# Patient Record
Sex: FEMALE | Race: BLACK OR AFRICAN AMERICAN | Employment: FULL TIME | URBAN - METROPOLITAN AREA
[De-identification: names, ages, dates, MRNs, and addresses within clinical notes are randomized per-mention and may not be internally consistent; named-entity substitution may affect disease eponyms.]

---

## 2022-11-16 ENCOUNTER — HOSPITAL ENCOUNTER (EMERGENCY)
Age: 37
Discharge: HOME | End: 2022-11-16
Attending: EMERGENCY MEDICINE

## 2022-11-16 VITALS
RESPIRATION RATE: 17 BRPM | DIASTOLIC BLOOD PRESSURE: 102 MMHG | HEIGHT: 65 IN | TEMPERATURE: 98.7 F | WEIGHT: 293 LBS | SYSTOLIC BLOOD PRESSURE: 160 MMHG | OXYGEN SATURATION: 98 % | BODY MASS INDEX: 48.82 KG/M2 | HEART RATE: 66 BPM

## 2022-11-16 DIAGNOSIS — M25.512 ACUTE PAIN OF LEFT SHOULDER: Primary | ICD-10-CM

## 2022-11-16 LAB
EKG ATRIAL RATE: 66 BPM
EKG DIAGNOSIS: NORMAL
EKG P AXIS: 42 DEGREES
EKG P-R INTERVAL: 146 MS
EKG Q-T INTERVAL: 410 MS
EKG QRS DURATION: 84 MS
EKG QTC CALCULATION (BAZETT): 429 MS
EKG R AXIS: 30 DEGREES
EKG T AXIS: -2 DEGREES
EKG VENTRICULAR RATE: 66 BPM

## 2022-11-16 PROCEDURE — 93005 ELECTROCARDIOGRAM TRACING: CPT | Performed by: PHYSICIAN ASSISTANT

## 2022-11-16 PROCEDURE — 99284 EMERGENCY DEPT VISIT MOD MDM: CPT

## 2022-11-16 PROCEDURE — 6360000002 HC RX W HCPCS: Performed by: PHYSICIAN ASSISTANT

## 2022-11-16 PROCEDURE — 96372 THER/PROPH/DIAG INJ SC/IM: CPT

## 2022-11-16 PROCEDURE — 6370000000 HC RX 637 (ALT 250 FOR IP): Performed by: PHYSICIAN ASSISTANT

## 2022-11-16 RX ORDER — KETOROLAC TROMETHAMINE 30 MG/ML
15 INJECTION, SOLUTION INTRAMUSCULAR; INTRAVENOUS ONCE
Status: COMPLETED | OUTPATIENT
Start: 2022-11-16 | End: 2022-11-16

## 2022-11-16 RX ORDER — METHOCARBAMOL 500 MG/1
1000 TABLET, FILM COATED ORAL ONCE
Status: COMPLETED | OUTPATIENT
Start: 2022-11-16 | End: 2022-11-16

## 2022-11-16 RX ORDER — LIDOCAINE 4 G/G
1 PATCH TOPICAL DAILY
Status: DISCONTINUED | OUTPATIENT
Start: 2022-11-16 | End: 2022-11-16 | Stop reason: HOSPADM

## 2022-11-16 RX ADMIN — KETOROLAC TROMETHAMINE 15 MG: 30 INJECTION, SOLUTION INTRAMUSCULAR at 10:07

## 2022-11-16 RX ADMIN — METHOCARBAMOL 1000 MG: 500 TABLET ORAL at 10:05

## 2022-11-16 ASSESSMENT — ENCOUNTER SYMPTOMS
NAUSEA: 0
BACK PAIN: 0
EYE REDNESS: 0
SHORTNESS OF BREATH: 0
VOMITING: 0

## 2022-11-16 ASSESSMENT — LIFESTYLE VARIABLES: HOW OFTEN DO YOU HAVE A DRINK CONTAINING ALCOHOL: NEVER

## 2022-11-16 NOTE — DISCHARGE INSTRUCTIONS
ED Course     Today, you were seen in the Emergency Department. You have been diagnosed with:   1. Acute pain of left shoulder      Disposition/Plan     IMPORTANT INSTRUCTIONS:  Your physical exam and EKG were reassuring. You may alternate Tylenol and Ibuprofen for pain coverage. Take Ibuprofen 600 mg every 6 hours for your pain. Ibuprofen: Take one 600 mg tablet every 6 hours as needed for pain and inflammation. Take this medicine with food, and be sure to drink plenty of water. Do not take this medicine continuously for longer than two weeks. Ibuprofen is a part of a class of medications called nonsteroidal anti-inflammatory drugs (NSAIDs). NSAIDs cause an increased risk of serious gastrointestinal (GI) adverse events, including bleeding, ulceration, and perforation of the stomach or intestines, which can be fatal. These events can occur at any time during use and without warning symptoms. Elderly patients and patients with a prior history of peptic ulcer disease and/or GI bleeding are at greater risk for serious GI events. Nonsteroidal anti-inflammatory drugs (NSAIDs) cause an increased risk of serious cardiovascular events, including heart attack and stroke, which can be fatal. This risk may occur early in treatment and may increase with duration of use. Use with caution. 3 hours after taking the Ibuprofen, alternate with Tylenol 500 mg. Tylenol 500mg   Take 1 tablet by mouth every 8 hours as needed for pain. Tylenol is highly effective when combined with an anti-inflammatory (NSAID) drug such as: ibuprofen (Advil, Aleve, Motrin, Naproxen). If you were prescribed both medications, take them together. They work better when taken together vs one or the other. Tylenol is in other pain medications, such as Vicodin and Percocet. Do not take more than 3,000mg (6 tablets) of Tylenol in one day. Do not consume more than 3 alcoholic beverages while taking Tylenol.    Do not take Tylenol if you have liver disease. You can use the muscle relaxers that were previously prescribed to you. Follow-up with your primary care physician as soon as possible regarding your visit. Be sure to return to the Emergency Department immediately if symptoms worsen or new symptoms occur as we discussed, such as chest pain, difficulty breathing, passing out. PLEASE FOLLOW UP WITH:  DEIDRA Alanis NP  [facility] [place] 8  [phone]    Schedule an appointment as soon as possible for a visit       Additional important information has been included with this packet, please make sure that you have read this information as it will better help you understand you illness/injury better.

## 2022-11-16 NOTE — ED PROVIDER NOTES
[address] ENCOUNTER          PHYSICIAN ASSISTANT NOTE     Date of evaluation: 11/16/2022    Chief Complaint     Shoulder Pain (Left shoulder. Previous injury 18 years ago, thinks she slept on it wrong. Took ibuprofen yesterday, which helped with the pain. She states she called the doctor on demand, who sent in a prescription for ibuprofen and muscle relaxer, but she forgot to pick it up. )    History of Present Illness     HPI: Miquel Gutiérrez is a 29 y.o. female with no pertinent past medical history who presents to the emergency department with left shoulder pain. Patient felt that she slept on her left shoulder wrong over the weekend. Throughout the weekend she had some pain to the left side of her neck as well and had pain with movement of her neck. She denies any direct trauma to her shoulder. Patient then has had some intermittent radiation from her shoulder into her chest, she denies any focal isolated chest pain. Today while she was at work she had worsening pain into her chest at which time she talk to her supervisor and called 911. Of note patient has been taking ibuprofen with significant improvement of pain. Patient also talked to a doctor on demand on Monday night, had muscle relaxers called into the pharmacy though forgot to pick them up. She denies any associated shortness of breath, abdominal pain, nausea or vomiting. She denies any leg swelling    With the exception of the above, there are no aggravating or alleviating factors. Review of Systems     Review of Systems   Constitutional:  Negative for chills and fever. HENT:  Negative for congestion. Eyes:  Negative for redness. Respiratory:  Negative for shortness of breath. Cardiovascular:  Negative for chest pain and leg swelling. Gastrointestinal:  Negative for nausea and vomiting. Genitourinary:  Negative for dysuria, frequency and hematuria. Musculoskeletal:  Positive for neck pain. Negative for back pain. + Shoulder pain   Skin:  Negative for rash. Neurological:  Negative for dizziness and headaches. As stated above, all other systems reviewed and are otherwise negative. Past Medical, Surgical, Family, and Social History     She has no past medical history on file. She has no past surgical history on file. Her family history is not on file. She reports that she has never smoked. She has never used smokeless tobacco. She reports that she does not drink alcohol and does not use drugs. Medications     There are no discharge medications for this patient. Allergies     She has No Known Allergies. Physical Exam     INITIAL VITALS: BP: (!) 150/101, Temp: 98.7 °F (37.1 °C), Heart Rate: 66, Resp: 17, SpO2: 99 %  Physical Exam  Constitutional:       General: She is not in acute distress. Appearance: Normal appearance. She is not ill-appearing, toxic-appearing or diaphoretic. HENT:      Head: Normocephalic and atraumatic. Right Ear: External ear normal.      Left Ear: External ear normal.      Nose: Nose normal.      Mouth/Throat:      Mouth: Mucous membranes are moist.      Pharynx: Oropharynx is clear. Eyes:      Extraocular Movements: Extraocular movements intact. Conjunctiva/sclera: Conjunctivae normal.   Cardiovascular:      Rate and Rhythm: Normal rate. Pulses: Normal pulses. Pulmonary:      Effort: Pulmonary effort is normal. No respiratory distress. Comments: Left-sided reproducible tenderness to the proximal pectoral muscle. Chest:      Chest wall: Tenderness present. Abdominal:      General: Abdomen is flat. There is no distension. Palpations: Abdomen is soft. Tenderness: There is no abdominal tenderness. There is no guarding or rebound. Musculoskeletal:         General: Normal range of motion. Cervical back: Normal range of motion. No rigidity or tenderness.       Comments: Reproducible tenderness along the left-sided trapezius muscle. Exacerbated by movement of the shoulder. No bony tenderness to left shoulder or clavicle. No midline cervical spinal tenderness to palpation. Skin:     General: Skin is warm and dry. Neurological:      General: No focal deficit present. Mental Status: She is alert and oriented to person, place, and time. Psychiatric:         Mood and Affect: Mood normal.         Behavior: Behavior normal.     Diagnostic Results     EKG   Interpreted in conjunction with emergency department physician No att. providers found  Rhythm: normal sinus   Rate: normal  Axis: normal  : none  Conduction: normal  ST Segments: no acute change  T Waves: no acute change  Q Waves:none  Clinical Impression: no acute changes  Comparison:  no previous for comparison    RADIOLOGY:  No orders to display     LABS:   Results for orders placed or performed during the hospital encounter of 11/16/22   EKG 12 Lead   Result Value Ref Range    Ventricular Rate 66 BPM    Atrial Rate 66 BPM    P-R Interval 146 ms    QRS Duration 84 ms    Q-T Interval 410 ms    QTc Calculation (Bazett) 429 ms    P Axis 42 degrees    R Axis 30 degrees    T Axis -2 degrees    Diagnosis       EKG performed in ER and to be interpreted by ER physician. Confirmed by MD, ER ([id number]),  Delia Flores ([phone]) on 11/16/2022 10:25:33 AM       RECENT VITALS:  BP: (!) 160/102, Temp: 98.7 °F (37.1 °C), Heart Rate: 66, Resp: 17, SpO2: 98 %     Procedures     N/a    ED Course     Nursing Notes, Past Medical Hx,Past Surgical Hx, Social Hx, Allergies, and Family Hx were reviewed.     The patient was given the following medications:  Orders Placed This Encounter   Medications    ketorolac (TORADOL) injection 15 mg    DISCONTD: lidocaine 4 % external patch 1 patch    methocarbamol (ROBAXIN) tablet 1,000 mg       CONSULTS:  None    MEDICAL DECISION MAKING / ASSESSMENT / PLAN     Vitals:    11/16/22 0929 11/16/22 1030   BP: (!) 150/101 (!) 160/102   Pulse: 66 Resp: 17    Temp: 98.7 °F (37.1 °C)    TempSrc: Oral    SpO2: 99% 98%   Weight: 298 lb 3.2 oz (135.3 kg)    Height: 5' 5\" (1.651 m)        Vero Mixon is a 29 y.o. female who presents to the emergency department with shoulder pain with intermittent radiation into her chest.  Symptoms started over the weekend after she felt that she slept on her shoulder incorrectly. Is exacerbated by movement, additionally chest pain that started is exacerbated by movement of her left shoulder. She denies any associated shortness of breath or isolated chest pain. She has been taking ibuprofen with improvement of symptoms. The patient has remained hemodynamically stable throughout their stay in the emergency department, and appears well overall. Given patient's recent history with chest pain and shoulder pain that is elicited by movement of her left shoulder I have low suspicion for underlying cardiac etiology. A screening EKG was performed and does not reveal any ischemic or concerning changes. In the absence of any direct trauma to her shoulder or reproducible bony tenderness on my exam I do not feel that any x-rays are indicated at this time. Patient was given Toradol, lidocaine patch and Robaxin for symptom control and on reassessment patient does have some improvement of symptoms. I feel the patient's pain is likely musculoskeletal in nature given her history, reproducibility on exam and with movement of her left upper extremity. This is all reiterated to the patient. Patient presents return precautions and advised to follow-up closely with her primary care provider in addition to alternating Tylenol and ibuprofen with lidocaine patches and muscle relaxers. The patient was evaluated by myself and the ED Attending Physician, Dr. Winsome Rachel. All management and disposition plans were discussed and agreed upon. Clinical Impression     1.  Acute pain of left shoulder        This note was dictated using voice-recognition software, which occasionally leads to inadvertent typographic errors. Disposition     PATIENT REFERRED TO:  DEIDRA Sin NP  [facility] [place] [address]  [phone]    Schedule an appointment as soon as possible for a visit       DISCHARGE MEDICATIONS:  There are no discharge medications for this patient.       DISPOSITION Decision To Discharge 11/16/2022 11:08:39 AM       KEZIA Flynn  11/16/22 [id number]

## 2022-11-16 NOTE — ED PROVIDER NOTES
ED Attending Attestation Note     Date of evaluation: 11/16/2022    This patient was seen by the AMADO. I have seen and examined the patient, agree with the workup, evaluation, management and diagnosis. The care plan has been discussed. I have reviewed the ECG and concur with the AMADO's interpretation. I was present for any procedures performed in the AMADO's note and have made edits to the note where appropriate. My assessment reveals 29 y.o. female presenting for left-sided shoulder pain. Pain is primarily at the superoanterior portion of the shoulder just inferior to the [facility] joint. She has positive Kang and soda can test and pain radiates into the neck and chest with some tenderness to palpation over the left pectoralis muscles as well. Suspect this is shoulder impingement and resultant inflammation, will treat symptomatically. EKG is reassuring. Highly doubt cardiac or other intrathoracic cause.         Bryon White MD  11/16/22 [id number]